# Patient Record
Sex: MALE
[De-identification: names, ages, dates, MRNs, and addresses within clinical notes are randomized per-mention and may not be internally consistent; named-entity substitution may affect disease eponyms.]

---

## 2017-12-26 PROBLEM — Z00.00 ENCOUNTER FOR PREVENTIVE HEALTH EXAMINATION: Status: ACTIVE | Noted: 2017-12-26

## 2018-01-19 ENCOUNTER — APPOINTMENT (OUTPATIENT)
Dept: OTOLARYNGOLOGY | Facility: CLINIC | Age: 58
End: 2018-01-19

## 2020-03-11 ENCOUNTER — TRANSCRIPTION ENCOUNTER (OUTPATIENT)
Age: 60
End: 2020-03-11

## 2022-09-29 ENCOUNTER — APPOINTMENT (OUTPATIENT)
Dept: SURGERY | Facility: CLINIC | Age: 62
End: 2022-09-29

## 2022-09-29 VITALS
HEIGHT: 73 IN | OXYGEN SATURATION: 99 % | DIASTOLIC BLOOD PRESSURE: 86 MMHG | TEMPERATURE: 97.3 F | RESPIRATION RATE: 17 BRPM | WEIGHT: 210 LBS | SYSTOLIC BLOOD PRESSURE: 126 MMHG | HEART RATE: 54 BPM | BODY MASS INDEX: 27.83 KG/M2

## 2022-09-29 DIAGNOSIS — Z78.9 OTHER SPECIFIED HEALTH STATUS: ICD-10-CM

## 2022-09-29 DIAGNOSIS — Z82.49 FAMILY HISTORY OF ISCHEMIC HEART DISEASE AND OTHER DISEASES OF THE CIRCULATORY SYSTEM: ICD-10-CM

## 2022-09-29 DIAGNOSIS — Z83.79 FAMILY HISTORY OF OTHER DISEASES OF THE DIGESTIVE SYSTEM: ICD-10-CM

## 2022-09-29 PROCEDURE — 99203 OFFICE O/P NEW LOW 30 MIN: CPT | Mod: 25

## 2022-09-29 PROCEDURE — 46320 REMOVAL OF HEMORRHOID CLOT: CPT

## 2022-09-29 RX ORDER — CICLESONIDE 50 UG/1
50 SPRAY NASAL
Refills: 0 | Status: ACTIVE | COMMUNITY

## 2022-09-29 RX ORDER — DOCUSATE SODIUM 100 MG/1
CAPSULE ORAL
Refills: 0 | Status: ACTIVE | COMMUNITY

## 2022-09-29 NOTE — HISTORY OF PRESENT ILLNESS
[FreeTextEntry1] : Harry is a 61 y/o male here for a consultation visit, possible hemorrhoids. \par \par Colonoscopy on 1/5/15 by Dr. Nogueira - normal mucosa in the whole examined duodenum. Normal mucosa in the whole stomach. Soft inflamed distal esophageal nodule, biopsied (biopsy). Pathology - Duodenum, second part, biopsy: duodenal mucosa within normal limits. \par \par Today pt reports no pain. Daily BMs, softer, daily Metamucil, PRN Colace, no straining, with discomfort, no bleeding, no episodes of incontinence, and denies feel prolapsed tissue for 3 weeks, unable to keep it in and harder to keep clean. Denies nausea and vomiting. Denies fever and chills. Good appetite. Not taking any anticoagulants.

## 2022-09-29 NOTE — ASSESSMENT
[FreeTextEntry1] : I have seen and evaluated patient and I have corroborated all nursing input into this note.  Patient with a thrombosed external hemorrhoid for 3 weeks which has not improved.  Therefore, I recommended excision.  Indications, risk, benefits, alternatives reviewed including but not limited to bleeding, infection, recurrence, and fissure.  Patient stated understanding and wished to proceed.  Half percent lidocaine with quarter percent Marcaine plus epinephrine injected.  Hemorrhoid excised.  Monsel solution applied.  Sitz bath's.  Continue Metamucil and Colace daily.  Tylenol and ibuprofen around-the-clock.  Oxycodone for breakthrough pain.  Follow-up 1 month.  Patient's last colonoscopy was in January 2015.  Recall to be sent January 2025.

## 2022-09-29 NOTE — PHYSICAL EXAM
[Normal Breath Sounds] : Normal breath sounds [Normal Heart Sounds] : normal heart sounds [Alert] : alert [Oriented to Person] : oriented to person [Oriented to Place] : oriented to place [Oriented to Time] : oriented to time [Calm] : calm [de-identified] : WNL [de-identified] : WNL [de-identified] : ELDERL [de-identified] : WNL ROM [de-identified] : WNL [FreeTextEntry1] : Perianal inspection demonstrated 2 cm right posterior external hemorrhoid thrombosis.  Digital exam and anoscopy deferred because of discomfort.

## 2022-10-04 LAB — CORE LAB BIOPSY: NORMAL

## 2022-10-26 ENCOUNTER — APPOINTMENT (OUTPATIENT)
Dept: SURGERY | Facility: CLINIC | Age: 62
End: 2022-10-26

## 2022-10-26 VITALS
OXYGEN SATURATION: 97 % | SYSTOLIC BLOOD PRESSURE: 131 MMHG | TEMPERATURE: 97.3 F | DIASTOLIC BLOOD PRESSURE: 90 MMHG | RESPIRATION RATE: 17 BRPM | HEART RATE: 54 BPM

## 2022-10-26 DIAGNOSIS — K60.2 ANAL FISSURE, UNSPECIFIED: ICD-10-CM

## 2022-10-26 DIAGNOSIS — K59.4 ANAL SPASM: ICD-10-CM

## 2022-10-26 DIAGNOSIS — K64.5 PERIANAL VENOUS THROMBOSIS: ICD-10-CM

## 2022-10-26 PROCEDURE — 99024 POSTOP FOLLOW-UP VISIT: CPT

## 2022-10-26 RX ORDER — METRONIDAZOLE 7.5 MG/G
0.75 GEL TOPICAL TWICE DAILY
Qty: 1 | Refills: 0 | Status: DISCONTINUED | COMMUNITY
Start: 2022-10-03 | End: 2022-10-26

## 2022-10-26 RX ORDER — OXYCODONE 5 MG/1
5 TABLET ORAL EVERY 4 HOURS
Qty: 4 | Refills: 0 | Status: DISCONTINUED | COMMUNITY
Start: 2022-09-29 | End: 2022-10-26

## 2022-10-26 NOTE — HISTORY OF PRESENT ILLNESS
[FreeTextEntry1] : Harry is a 63 y/o male here for a follow up visit. \par \par Colonoscopy on 1/5/15 by Dr. Nogueira - normal mucosa in the whole examined duodenum. Normal mucosa in the whole stomach. Soft inflamed distal esophageal nodule, biopsied (biopsy). Pathology - Duodenum, second part, biopsy: duodenal mucosa within normal limits. \par \par Last seen on 9/29/22 - Perianal inspection demonstrated 2 cm right posterior external hemorrhoid thrombosis. Digital exam and anoscopy deferred because of discomfort. I have seen and evaluated patient and I have corroborated all nursing input into this note. Patient with a thrombosed external hemorrhoid for 3 weeks which has not improved. Therefore, I recommended excision. Indications, risk, benefits, alternatives reviewed including but not limited to bleeding, infection, recurrence, and fissure. Patient stated understanding and wished to proceed. Half percent lidocaine with quarter percent Marcaine plus epinephrine injected. Hemorrhoid excised. Monsel solution applied. Sitz bath's. Continue Metamucil and Colace daily. Tylenol and ibuprofen around-the-clock. Oxycodone for breakthrough pain. Follow-up 1 month. Patient's last colonoscopy was in January 2015. Recall to be sent January 2025. \par \par Pathology - Right posterior hemorrhoid, hemorrhoidectomy: thrombosed hemorrhoids. \par \par Today pt reports 1/10 pain. Daily BMs, formed, Colace and Metamucil with some relief, sometimes straining, some pain, small amounts of discharge with clear fluid, with bleeding on tp (better than last visit), no episodes of incontinence, and denies feeling swollen or prolapsed tissue. Denies nausea and vomiting. Denies fever and chills. Good appetite. Not taking any anticoagulants. \par \par

## 2022-10-26 NOTE — ASSESSMENT
[FreeTextEntry1] : Patient with incomplete healing of wound after excision of thrombosed hemorrhoid.  I discussed the risk of this wound developing into a fissure.  It does not have the appearance of a fissure at this time.  However, I am concerned that if the wound persists longer than a fissure will resolve.  The patient has hard bowel movements despite daily Metamucil.  I recommended he add MiraLAX to his bowel regimen and I prescribed topical diltiazem.  Patient will follow-up with me in 1 month.

## 2022-10-26 NOTE — PHYSICAL EXAM
[FreeTextEntry1] : Perianal inspection demonstrated incomplete healing of the right posterior wound. Doxepin Counseling:  Patient advised that the medication is sedating and not to drive a car after taking this medication. Patient informed of potential adverse effects including but not limited to dry mouth, urinary retention, and blurry vision.  The patient verbalized understanding of the proper use and possible adverse effects of doxepin.  All of the patient's questions and concerns were addressed.

## 2022-11-21 NOTE — HISTORY OF PRESENT ILLNESS
[FreeTextEntry1] : Harry is a 61 y/o male here for a 1 month follow up visit.\par \par Colonoscopy on 1/5/15 by Dr. Nogueira - normal mucosa in the whole examined duodenum. Normal mucosa in the whole stomach. Soft inflamed distal esophageal nodule, biopsied (biopsy). Pathology - Duodenum, second part, biopsy: duodenal mucosa within normal limits. \par \par s/p perianal 2 cm right posterior thrombosed external hemorrhoid excised, half percent Lidocaine with quarter percent Marcaine plus Epinephrine injected, Monsel solution applied on 09/29/22: Tissue Biopsied.\par \par Last seen 10/26/22 - Patient with incomplete healing of wound after excision of thrombosed hemorrhoid. I discussed the risk of this wound developing into a fissure. It does not have the appearance of a fissure at this time. However, I am concerned that if the wound persists longer than a fissure will resolve. The patient has hard bowel movements despite daily Metamucil. I recommended he add MiraLAX to his bowel regimen and I prescribed topical diltiazem. Patient will follow-up with me in 1 month. \par

## 2022-11-23 ENCOUNTER — APPOINTMENT (OUTPATIENT)
Dept: SURGERY | Facility: CLINIC | Age: 62
End: 2022-11-23

## 2024-05-30 ENCOUNTER — APPOINTMENT (OUTPATIENT)
Dept: OTOLARYNGOLOGY | Facility: CLINIC | Age: 64
End: 2024-05-30
Payer: COMMERCIAL

## 2024-05-30 VITALS
DIASTOLIC BLOOD PRESSURE: 90 MMHG | HEIGHT: 73 IN | HEART RATE: 55 BPM | SYSTOLIC BLOOD PRESSURE: 132 MMHG | BODY MASS INDEX: 30.48 KG/M2 | WEIGHT: 230 LBS

## 2024-05-30 DIAGNOSIS — H81.92 UNSPECIFIED DISORDER OF VESTIBULAR FUNCTION, LEFT EAR: ICD-10-CM

## 2024-05-30 DIAGNOSIS — H81.12 BENIGN PAROXYSMAL VERTIGO, LEFT EAR: ICD-10-CM

## 2024-05-30 DIAGNOSIS — H90.42 SENSORINEURAL HEARING LOSS, UNILATERAL, LEFT EAR, WITH UNRESTRICTED HEARING ON THE CONTRALATERAL SIDE: ICD-10-CM

## 2024-05-30 PROCEDURE — 92557 COMPREHENSIVE HEARING TEST: CPT

## 2024-05-30 PROCEDURE — 99204 OFFICE O/P NEW MOD 45 MIN: CPT

## 2024-05-30 PROCEDURE — 92567 TYMPANOMETRY: CPT

## 2024-05-30 PROCEDURE — 92504 EAR MICROSCOPY EXAMINATION: CPT

## 2024-05-30 RX ORDER — CHROMIUM 200 MCG
TABLET ORAL
Refills: 0 | Status: ACTIVE | COMMUNITY

## 2024-05-30 NOTE — HISTORY OF PRESENT ILLNESS
[de-identified] : 63 year old man presents for initial evaluation for vertigo.  Referred by Virgen Yang, PT, DPT History of bilateral tinnitus (left worse than right for 30+ years).  Sudden onset of vertigo about 20 years ago  Associated symptom: spinning, dizziness,  Reports 1 episode of vertigo last year treated with Epley maneuver x2 with relief.  States episode started again in March 2024, again treated with Epley.  States changes in position and turning in bed makes spinning sensation worse.  Patient was seen by ENT 1 year ago for left ear infection-treated with antibiotics, nasal sprays.  Reports intermittent clicking sensation in left ear, occasional fullness.

## 2024-05-30 NOTE — PHYSICAL EXAM
[Binocular Microscopic Exam] : Binocular microscopic exam was performed [Rinne Test Air Conduction Persists > Bone Conduction Right] : air conduction greater than bone conduction on the right [Rinne Test Air Conduction Persists > Bone Conduction Left] : air conduction greater than bone conduction on the left [Hearing Hartman Test (Tuning Fork On Forehead)] : no lateralization of tone [Hearing Loss Right Only] : normal [Hearing Loss Left Only] : normal [Nystagmus] : ~T ~M nystagmus was seen [Fukuda Step Test] : Fukuda Step Test was Negative [Romberg's Sign] : Romberg's sign was absent [Fistula Sign] : Fistula Sign: Negative [Past-Pointing] : Past-Pointing: Negative [[place]-Seanke] : [place]pike: Positive [Normal] : no rashes

## 2024-05-30 NOTE — DATA REVIEWED
[de-identified] : An audiogram was ordered an performed include pure tones, tympanometry and speech testing for the patients complaints of L hearing loss, vertigo I have independently reviewed the patient's audiogram from today and my findings include L SNHL, aysmmetric, same as prior [de-identified] : MRI was 20 yrs ago

## 2024-06-12 ENCOUNTER — OUTPATIENT (OUTPATIENT)
Dept: OUTPATIENT SERVICES | Facility: HOSPITAL | Age: 64
LOS: 1 days | End: 2024-06-12
Payer: COMMERCIAL

## 2024-06-12 ENCOUNTER — TRANSCRIPTION ENCOUNTER (OUTPATIENT)
Age: 64
End: 2024-06-12

## 2024-06-12 ENCOUNTER — APPOINTMENT (OUTPATIENT)
Dept: MRI IMAGING | Facility: CLINIC | Age: 64
End: 2024-06-12

## 2024-06-12 DIAGNOSIS — H90.42 SENSORINEURAL HEARING LOSS, UNILATERAL, LEFT EAR, WITH UNRESTRICTED HEARING ON THE CONTRALATERAL SIDE: ICD-10-CM

## 2024-06-12 PROCEDURE — 70553 MRI BRAIN STEM W/O & W/DYE: CPT | Mod: 26

## 2024-06-12 PROCEDURE — A9585: CPT

## 2024-06-12 PROCEDURE — 70553 MRI BRAIN STEM W/O & W/DYE: CPT

## 2024-10-08 ENCOUNTER — APPOINTMENT (OUTPATIENT)
Dept: OTOLARYNGOLOGY | Facility: CLINIC | Age: 64
End: 2024-10-08
Payer: COMMERCIAL

## 2024-10-08 ENCOUNTER — NON-APPOINTMENT (OUTPATIENT)
Age: 64
End: 2024-10-08

## 2024-10-08 VITALS — BODY MASS INDEX: 30.48 KG/M2 | WEIGHT: 230 LBS | HEIGHT: 73 IN

## 2024-10-08 DIAGNOSIS — H81.92 UNSPECIFIED DISORDER OF VESTIBULAR FUNCTION, LEFT EAR: ICD-10-CM

## 2024-10-08 DIAGNOSIS — G43.809 OTHER MIGRAINE, NOT INTRACTABLE, W/OUT STATUS MIGRAINOSUS: ICD-10-CM

## 2024-10-08 DIAGNOSIS — H81.12 BENIGN PAROXYSMAL VERTIGO, LEFT EAR: ICD-10-CM

## 2024-10-08 PROCEDURE — 92504 EAR MICROSCOPY EXAMINATION: CPT

## 2024-10-08 PROCEDURE — 99214 OFFICE O/P EST MOD 30 MIN: CPT

## 2024-10-08 RX ORDER — RIZATRIPTAN BENZOATE 5 MG/1
5 TABLET, ORALLY DISINTEGRATING ORAL
Qty: 10 | Refills: 0 | Status: ACTIVE | COMMUNITY
Start: 2024-10-08 | End: 1900-01-01

## 2025-01-07 ENCOUNTER — APPOINTMENT (OUTPATIENT)
Dept: OTOLARYNGOLOGY | Facility: CLINIC | Age: 65
End: 2025-01-07
Payer: COMMERCIAL

## 2025-01-07 VITALS — HEIGHT: 73 IN | WEIGHT: 230 LBS | BODY MASS INDEX: 30.48 KG/M2

## 2025-01-07 DIAGNOSIS — H90.42 SENSORINEURAL HEARING LOSS, UNILATERAL, LEFT EAR, WITH UNRESTRICTED HEARING ON THE CONTRALATERAL SIDE: ICD-10-CM

## 2025-01-07 DIAGNOSIS — G43.809 OTHER MIGRAINE, NOT INTRACTABLE, W/OUT STATUS MIGRAINOSUS: ICD-10-CM

## 2025-01-07 DIAGNOSIS — H81.8X9 OTHER DISORDERS OF VESTIBULAR FUNCTION, UNSPECIFIED EAR: ICD-10-CM

## 2025-01-07 DIAGNOSIS — H81.92 UNSPECIFIED DISORDER OF VESTIBULAR FUNCTION, LEFT EAR: ICD-10-CM

## 2025-01-07 PROCEDURE — 99214 OFFICE O/P EST MOD 30 MIN: CPT

## 2025-01-07 RX ORDER — VENLAFAXINE HYDROCHLORIDE 37.5 MG/1
37.5 CAPSULE, EXTENDED RELEASE ORAL
Qty: 30 | Refills: 3 | Status: ACTIVE | COMMUNITY
Start: 2025-01-07 | End: 1900-01-01

## 2025-04-22 ENCOUNTER — APPOINTMENT (OUTPATIENT)
Dept: OTOLARYNGOLOGY | Facility: CLINIC | Age: 65
End: 2025-04-22

## 2025-06-19 VITALS — HEIGHT: 73 IN | WEIGHT: 230 LBS | BODY MASS INDEX: 30.48 KG/M2

## 2025-06-19 PROBLEM — Z12.11 COLON CANCER SCREENING: Status: ACTIVE | Noted: 2025-06-19

## 2025-06-19 RX ORDER — CROMOLYN SODIUM 5.2 MG
5.2 AEROSOL, SPRAY WITH PUMP (ML) NASAL
Refills: 0 | Status: ACTIVE | COMMUNITY

## 2025-06-24 PROBLEM — Z12.11 ENCOUNTER FOR SCREENING COLONOSCOPY: Status: ACTIVE | Noted: 2025-06-24 | Resolved: 2025-07-08

## 2025-06-24 RX ORDER — SODIUM SULFATE, POTASSIUM SULFATE AND MAGNESIUM SULFATE 1.6; 3.13; 17.5 G/177ML; G/177ML; G/177ML
17.5-3.13-1.6 SOLUTION ORAL
Qty: 1 | Refills: 0 | Status: ACTIVE | COMMUNITY
Start: 2025-06-24 | End: 1900-01-01

## 2025-08-04 ENCOUNTER — TRANSCRIPTION ENCOUNTER (OUTPATIENT)
Age: 65
End: 2025-08-04

## 2025-08-04 ENCOUNTER — RESULT REVIEW (OUTPATIENT)
Age: 65
End: 2025-08-04

## 2025-08-04 ENCOUNTER — APPOINTMENT (OUTPATIENT)
Dept: SURGERY | Facility: HOSPITAL | Age: 65
End: 2025-08-04
Payer: COMMERCIAL

## 2025-08-04 ENCOUNTER — APPOINTMENT (OUTPATIENT)
Dept: SURGERY | Facility: HOSPITAL | Age: 65
End: 2025-08-04

## 2025-08-04 ENCOUNTER — OUTPATIENT (OUTPATIENT)
Dept: OUTPATIENT SERVICES | Facility: HOSPITAL | Age: 65
LOS: 1 days | End: 2025-08-04
Payer: COMMERCIAL

## 2025-08-04 VITALS
SYSTOLIC BLOOD PRESSURE: 120 MMHG | HEART RATE: 50 BPM | DIASTOLIC BLOOD PRESSURE: 81 MMHG | RESPIRATION RATE: 16 BRPM | OXYGEN SATURATION: 96 %

## 2025-08-04 VITALS
SYSTOLIC BLOOD PRESSURE: 134 MMHG | OXYGEN SATURATION: 99 % | HEART RATE: 57 BPM | WEIGHT: 225.09 LBS | DIASTOLIC BLOOD PRESSURE: 81 MMHG | RESPIRATION RATE: 15 BRPM | HEIGHT: 73 IN | TEMPERATURE: 98 F

## 2025-08-04 DIAGNOSIS — K64.8 OTHER HEMORRHOIDS: Chronic | ICD-10-CM

## 2025-08-04 DIAGNOSIS — Z12.11 ENCOUNTER FOR SCREENING FOR MALIGNANT NEOPLASM OF COLON: ICD-10-CM

## 2025-08-04 PROCEDURE — 45385 COLONOSCOPY W/LESION REMOVAL: CPT

## 2025-08-04 PROCEDURE — 88305 TISSUE EXAM BY PATHOLOGIST: CPT

## 2025-08-04 PROCEDURE — 0753T DGTZ GLS MCRSCP SLD LEVEL IV: CPT

## 2025-08-04 PROCEDURE — 88305 TISSUE EXAM BY PATHOLOGIST: CPT | Mod: 26

## 2025-08-04 PROCEDURE — 45385 COLONOSCOPY W/LESION REMOVAL: CPT | Mod: PT

## 2025-08-04 DEVICE — NET RETRV ROT ROTH 2.5MMX230CM: Type: IMPLANTABLE DEVICE | Status: FUNCTIONAL

## 2025-08-04 RX ADMIN — Medication 30 MILLILITER(S): at 08:33

## 2025-08-06 LAB — SURGICAL PATHOLOGY STUDY: SIGNIFICANT CHANGE UP

## (undated) DEVICE — SYR LUER LOK 50CC

## (undated) DEVICE — POLY TRAP ETRAP

## (undated) DEVICE — SUCTION YANKAUER NO CONTROL VENT

## (undated) DEVICE — BRUSH COLONOSCOPY CYTOLOGY

## (undated) DEVICE — PACK IV START WITH CHG

## (undated) DEVICE — TUBING SUCTION CONN 6FT STERILE

## (undated) DEVICE — FORCEP RADIAL JAW 4 JUMBO 2.8MM 3.2MM 240CM ORANGE DISP

## (undated) DEVICE — SENSOR O2 FINGER ADULT

## (undated) DEVICE — FOLEY HOLDER STATLOCK 2 WAY ADULT

## (undated) DEVICE — IRRIGATOR BIO SHIELD

## (undated) DEVICE — CATH IV SAFE BC 20G X 1.16" (PINK)

## (undated) DEVICE — CLAMP BX HOT RAD JAW 3

## (undated) DEVICE — ELCTR GROUNDING PAD ADULT COVIDIEN

## (undated) DEVICE — BIOPSY FORCEP RADIAL JAW 4 STANDARD WITH NEEDLE

## (undated) DEVICE — SOL INJ NS 0.9% 500ML 2 PORT

## (undated) DEVICE — TUBING IV SET GRAVITY 3Y 100" MACRO

## (undated) DEVICE — CATH IV SAFE BC 22G X 1" (BLUE)

## (undated) DEVICE — TUBING SUCTION 20FT